# Patient Record
Sex: MALE | Race: WHITE
[De-identification: names, ages, dates, MRNs, and addresses within clinical notes are randomized per-mention and may not be internally consistent; named-entity substitution may affect disease eponyms.]

---

## 2020-01-07 ENCOUNTER — HOSPITAL ENCOUNTER (INPATIENT)
Dept: HOSPITAL 25 - ED | Age: 40
LOS: 3 days | Discharge: HOME | DRG: 753 | End: 2020-01-10
Attending: PSYCHIATRY & NEUROLOGY | Admitting: PSYCHIATRY & NEUROLOGY
Payer: COMMERCIAL

## 2020-01-07 DIAGNOSIS — F17.210: ICD-10-CM

## 2020-01-07 DIAGNOSIS — Z88.5: ICD-10-CM

## 2020-01-07 DIAGNOSIS — Z62.810: ICD-10-CM

## 2020-01-07 DIAGNOSIS — Z88.1: ICD-10-CM

## 2020-01-07 DIAGNOSIS — K21.9: ICD-10-CM

## 2020-01-07 DIAGNOSIS — F31.9: Primary | ICD-10-CM

## 2020-01-07 DIAGNOSIS — Z91.040: ICD-10-CM

## 2020-01-07 DIAGNOSIS — F60.2: ICD-10-CM

## 2020-01-07 DIAGNOSIS — Z81.1: ICD-10-CM

## 2020-01-07 DIAGNOSIS — Z79.899: ICD-10-CM

## 2020-01-07 DIAGNOSIS — Z91.030: ICD-10-CM

## 2020-01-07 LAB
ALBUMIN SERPL BCG-MCNC: 4.4 G/DL (ref 3.2–5.2)
ALBUMIN/GLOB SERPL: 2 {RATIO} (ref 1–3)
ALP SERPL-CCNC: 54 U/L (ref 34–104)
ALT SERPL W P-5'-P-CCNC: 13 U/L (ref 7–52)
ANION GAP SERPL CALC-SCNC: 5 MMOL/L (ref 2–11)
APAP SERPL-MCNC: < 15 MCG/ML
AST SERPL-CCNC: 15 U/L (ref 13–39)
BASOPHILS # BLD AUTO: 0.1 10^3/UL (ref 0–0.2)
BUN SERPL-MCNC: 16 MG/DL (ref 6–24)
BUN/CREAT SERPL: 18.4 (ref 8–20)
CALCIUM SERPL-MCNC: 9.3 MG/DL (ref 8.6–10.3)
CHLORIDE SERPL-SCNC: 105 MMOL/L (ref 101–111)
EOSINOPHIL # BLD AUTO: 0.2 10^3/UL (ref 0–0.6)
GLOBULIN SER CALC-MCNC: 2.2 G/DL (ref 2–4)
GLUCOSE SERPL-MCNC: 114 MG/DL (ref 70–100)
HCO3 SERPL-SCNC: 27 MMOL/L (ref 22–32)
HCT VFR BLD AUTO: 43 % (ref 42–52)
HGB BLD-MCNC: 15 G/DL (ref 14–18)
LYMPHOCYTES # BLD AUTO: 2 10^3/UL (ref 1–4.8)
MCH RBC QN AUTO: 32 PG (ref 27–31)
MCHC RBC AUTO-ENTMCNC: 35 G/DL (ref 31–36)
MCV RBC AUTO: 92 FL (ref 80–94)
MONOCYTES # BLD AUTO: 0.7 10^3/UL (ref 0–0.8)
NEUTROPHILS # BLD AUTO: 6.9 10^3/UL (ref 1.5–7.7)
NRBC # BLD AUTO: 0 10^3/UL
NRBC BLD QL AUTO: 0
PLATELET # BLD AUTO: 168 10^3/UL (ref 150–450)
POTASSIUM SERPL-SCNC: 4.2 MMOL/L (ref 3.5–5)
PROT SERPL-MCNC: 6.6 G/DL (ref 6.4–8.9)
RBC # BLD AUTO: 4.66 10^6 /UL (ref 4.18–5.48)
SALICYLATES SERPL-MCNC: < 2.5 MG/DL (ref ?–30)
SODIUM SERPL-SCNC: 137 MMOL/L (ref 135–145)
TSH SERPL-ACNC: 0.99 MCIU/ML (ref 0.34–5.6)
WBC # BLD AUTO: 9.9 10^3/UL (ref 3.5–10.8)

## 2020-01-07 PROCEDURE — 80061 LIPID PANEL: CPT

## 2020-01-07 PROCEDURE — 99222 1ST HOSP IP/OBS MODERATE 55: CPT

## 2020-01-07 PROCEDURE — 84443 ASSAY THYROID STIM HORMONE: CPT

## 2020-01-07 PROCEDURE — 80053 COMPREHEN METABOLIC PANEL: CPT

## 2020-01-07 PROCEDURE — 85025 COMPLETE CBC W/AUTO DIFF WBC: CPT

## 2020-01-07 PROCEDURE — 81003 URINALYSIS AUTO W/O SCOPE: CPT

## 2020-01-07 PROCEDURE — 80329 ANALGESICS NON-OPIOID 1 OR 2: CPT

## 2020-01-07 PROCEDURE — 83036 HEMOGLOBIN GLYCOSYLATED A1C: CPT

## 2020-01-07 PROCEDURE — 99284 EMERGENCY DEPT VISIT MOD MDM: CPT

## 2020-01-07 PROCEDURE — 80178 ASSAY OF LITHIUM: CPT

## 2020-01-07 PROCEDURE — 90853 GROUP PSYCHOTHERAPY: CPT

## 2020-01-07 PROCEDURE — G0480 DRUG TEST DEF 1-7 CLASSES: HCPCS

## 2020-01-07 PROCEDURE — 80320 DRUG SCREEN QUANTALCOHOLS: CPT

## 2020-01-07 PROCEDURE — 36415 COLL VENOUS BLD VENIPUNCTURE: CPT

## 2020-01-07 PROCEDURE — 93005 ELECTROCARDIOGRAM TRACING: CPT

## 2020-01-07 PROCEDURE — 99231 SBSQ HOSP IP/OBS SF/LOW 25: CPT

## 2020-01-07 PROCEDURE — 99238 HOSP IP/OBS DSCHRG MGMT 30/<: CPT

## 2020-01-07 PROCEDURE — 96130 PSYCL TST EVAL PHYS/QHP 1ST: CPT

## 2020-01-07 PROCEDURE — 80307 DRUG TEST PRSMV CHEM ANLYZR: CPT

## 2020-01-07 RX ADMIN — PANTOPRAZOLE SODIUM SCH: 40 TABLET, DELAYED RELEASE ORAL at 23:55

## 2020-01-07 RX ADMIN — THERA TABS SCH: TAB at 18:17

## 2020-01-07 RX ADMIN — NICOTINE SCH PATCH: 21 PATCH TRANSDERMAL at 18:17
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
## 2020-01-07 NOTE — XMS REPORT
Continuity of Care Document (CCD)

 Created on:2019



Patient:Joe Hernandez III

Sex:Male

:1980

External Reference #:[id number]





Demographics







 Address  [address] [place] [address]

 

 Home Phone  [phone]

 

 Mobile Phone  [phone]

 

 Email Address  [email or web address]

 

 Preferred Language  en

 

 Marital Status  Not  or 

 

 Restorationist Affiliation  Unknown

 

 Race  White

 

 Ethnic Group  Not  or 









Author







 Name  Gilson Red MD (transmitted by agent of provider Berkley Dahl)

 

 Address  [address]



   Unavailable



   [place] [address]









Care Team Providers







 Name  Role  Phone

 

 Concepcion Granados PA - Medical  Care Team Information   [phone]









Problems







 Description

 

 No Information Available







Social History







 Type  Date  Description  Comments

 

 Birth Sex    Unknown  

 

 ETOH Use    Denies alcohol use  

 

 Tobacco Use  Start: Unknown  Patient is a current smoker,  



     smokes every day  

 

 Recreational Drug Use    Current Drug User  

 

 Recreational Drug Use    Cannabis vapor script as needed  

 

 Smoking Status  Reviewed: 19  Patient is a current smoker,  



     smokes every day  

 

 Exercise Type/Frequency    Does not exercise  







Allergies, Adverse Reactions, Alerts







 Active Allergies  Reaction  Severity  Comments  Date

 

 Cephalexin        2017

 

 Latex        2017

 

 Codeine        2017

 

 Bee Sting        2017







Medications







 Active Medications  SIG  Qnty  Indications  Ordering Provider  Date

 

 Protonix  1 by mouth daily      Unknown  



        40mg Tablets          



 DR          

 

 Epipen 2-Stephon  use as directed      Unknown  



           



 0.3mg/0.3ML Solution          



 Auto-Inject          



           

 

 Ventolin HFA  2 puffs by mouth      Unknown  



   four times a day as        



 108(90Base) mcg/Act  needed        



 Aerosol          



           

 

 Albuterol Sulfate  1 vial via      Unknown  



   nebulizer 4 times        



 (2.5mg/3ML) 0.083%  daily as needed        



 Nebulizer          



           

 

 Diltiazem HCL ER        Unknown  



                120mg          



 Caps ER 12HR          



           







Immunizations







 Description

 

 No Information Available







Vital Signs







 Date  Vital  Result  Comment

 

 2019  1:59pm  Height  69 inches  5'9"









 Weight  207.00 lb  

 

 Heart Rate  85 /min  

 

 BP Systolic Sitting  120 mmHg  

 

 BP Diastolic Sitting  82 mmHg  

 

 Body Temperature  98.0 F  

 

 BMI (Body Mass Index)  30.6 kg/m2  









 2017 12:30pm  Height  69 inches  5'9"









 Weight  235.00 lb  

 

 Heart Rate  76 /min  

 

 BP Systolic Recheck  132 mmHg  

 

 BP Diastolic Recheck  84 mmHg  

 

 Respiratory Rate  16 /min  

 

 Body Temperature  98.6 F  

 

 BMI (Body Mass Index)  34.7 kg/m2  







Results







 Description

 

 No Information Available







Procedures







 Description

 

 No Information Available







Medical Devices







 Description

 

 No Information Available







Encounters







 Description

 

 No Information Available







Assessments







 Date  Code  Description  Provider

 

 2019  K43.2  Incisional hernia without obstruction or  Gilson Red MD



     gangrene  







Plan of Treatment

Future Appointment(s):2020  1:30 pm - Gilson Red MD at [facility] AT Dud[facility]19/06/2020 10:00 am - Gilson Red MD at 
[facility] AT Ypjmskss32/12/2019 - Gilson Red MDK43.2 
Incisional hernia without obstruction or gangreneFollow up:Please follow the 
instructions for the scheduling of your surgery.  Please call if you have any 
questions or concerns.



Functional Status







 Description

 

 No Information Available







Mental Status







 Description

 

 No Information Available







Referrals







 Description

 

 No Information Available

## 2020-01-07 NOTE — XMS REPORT
Continuity of Care Document (CCD)

 Created on:2019



Patient:Joe Hernandez III

Sex:Male

:1980

External Reference #:[id number]





Demographics







 Address  [address] [place] [address]

 

 Home Phone  [phone]

 

 Mobile Phone  [phone]

 

 Email Address  [email or web address]

 

 Preferred Language  en

 

 Marital Status  Not  or 

 

 Episcopal Affiliation  Unknown

 

 Race  White

 

 Ethnic Group  Not  or 









Author







 Name  Gilson Red MD (transmitted by agent of provider Berkley Dahl)

 

 Address  [address]



   Unavailable



   [place] [address]









Care Team Providers







 Name  Role  Phone

 

 Concepcion Granados PA - Medical  Care Team Information   [phone]









Problems







 Description

 

 No Information Available







Social History







 Type  Date  Description  Comments

 

 Birth Sex    Unknown  

 

 ETOH Use    Denies alcohol use  

 

 Tobacco Use  Start: Unknown  Patient is a current smoker,  



     smokes every day  

 

 Recreational Drug Use    Current Drug User  

 

 Recreational Drug Use    Cannabis vapor script as needed  

 

 Smoking Status  Reviewed: 19  Patient is a current smoker,  



     smokes every day  

 

 Exercise Type/Frequency    Does not exercise  







Allergies, Adverse Reactions, Alerts







 Active Allergies  Reaction  Severity  Comments  Date

 

 Cephalexin        2017

 

 Latex        2017

 

 Codeine        2017

 

 Bee Sting        2017







Medications







 Active Medications  SIG  Qnty  Indications  Ordering Provider  Date

 

 Protonix  1 by mouth daily      Unknown  



        40mg Tablets          



 DR          

 

 Epipen 2-Stephon  use as directed      Unknown  



           



 0.3mg/0.3ML Solution          



 Auto-Inject          



           

 

 Ventolin HFA  2 puffs by mouth      Unknown  



   four times a day as        



 108(90Base) mcg/Act  needed        



 Aerosol          



           

 

 Albuterol Sulfate  1 vial via      Unknown  



   nebulizer 4 times        



 (2.5mg/3ML) 0.083%  daily as needed        



 Nebulizer          



           

 

 Diltiazem HCL ER        Unknown  



                120mg          



 Caps ER 12HR          



           







Immunizations







 Description

 

 No Information Available







Vital Signs







 Date  Vital  Result  Comment

 

 2019  1:59pm  Height  69 inches  5'9"









 Weight  207.00 lb  

 

 Heart Rate  85 /min  

 

 BP Systolic Sitting  120 mmHg  

 

 BP Diastolic Sitting  82 mmHg  

 

 Body Temperature  98.0 F  

 

 BMI (Body Mass Index)  30.6 kg/m2  









 2017 12:30pm  Height  69 inches  5'9"









 Weight  235.00 lb  

 

 Heart Rate  76 /min  

 

 BP Systolic Recheck  132 mmHg  

 

 BP Diastolic Recheck  84 mmHg  

 

 Respiratory Rate  16 /min  

 

 Body Temperature  98.6 F  

 

 BMI (Body Mass Index)  34.7 kg/m2  







Results







 Description

 

 No Information Available







Procedures







 Description

 

 No Information Available







Medical Devices







 Description

 

 No Information Available







Encounters







 Description

 

 No Information Available







Assessments







 Date  Code  Description  Provider

 

 2019  K43.2  Incisional hernia without obstruction or  Gilson Red MD



     gangrene  







Plan of Treatment

Future Appointment(s):2020  1:30 pm - Gilson Red MD at [facility] AT Nzspqmql37/06/2020 10:00 am - Gilson Red MD at 
[facility] AT [facility]98/12/2019 - Gilson Red MDK43.2 
Incisional hernia without obstruction or gangreneFollow up:Please follow the 
instructions for the scheduling of your surgery.  Please call if you have any 
questions or concerns.



Functional Status







 Description

 

 No Information Available







Mental Status







 Description

 

 No Information Available







Referrals







 Description

 

 No Information Available

## 2020-01-08 LAB
CHOLEST SERPL-MCNC: 164 MG/DL
HDLC SERPL-MCNC: 47.8 MG/DL
TRIGL SERPL-MCNC: 112 MG/DL

## 2020-01-08 PROCEDURE — GZHZZZZ GROUP PSYCHOTHERAPY: ICD-10-PCS

## 2020-01-08 RX ADMIN — LITHIUM CARBONATE SCH MG: 300 TABLET ORAL at 12:32

## 2020-01-08 RX ADMIN — LITHIUM CARBONATE SCH MG: 300 TABLET ORAL at 20:30

## 2020-01-08 RX ADMIN — ARIPIPRAZOLE SCH MG: 5 TABLET ORAL at 20:30

## 2020-01-08 RX ADMIN — NICOTINE POLACRILEX PRN MG: 4 LOZENGE ORAL at 20:30

## 2020-01-08 RX ADMIN — THERA TABS SCH: TAB at 09:22

## 2020-01-08 RX ADMIN — PANTOPRAZOLE SODIUM SCH MG: 40 TABLET, DELAYED RELEASE ORAL at 20:30

## 2020-01-08 RX ADMIN — PANTOPRAZOLE SODIUM SCH MG: 40 TABLET, DELAYED RELEASE ORAL at 07:47

## 2020-01-08 RX ADMIN — NICOTINE SCH PATCH: 21 PATCH TRANSDERMAL at 07:48

## 2020-01-08 RX ADMIN — DILTIAZEM HYDROCHLORIDE SCH MG: 240 CAPSULE, COATED, EXTENDED RELEASE ORAL at 07:47

## 2020-01-08 RX ADMIN — NICOTINE POLACRILEX PRN MG: 4 LOZENGE ORAL at 17:43
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
## 2020-01-08 NOTE — PN
BSU: Group Therapy Note





- Service Type


Service Type: 76247 Group Psychotherapy - Medication Education Group:  Patient 

was attentive and participatory in group, and remained in good behavioral 

control.  Patient expressed positive insights regarding relevant treatment 

interventions.  Patient stated understanding of material discussed and had 

appropriate questions.  Patient defensive and irritable when redirected.

## 2020-01-09 RX ADMIN — THERA TABS SCH: TAB at 08:12

## 2020-01-09 RX ADMIN — NICOTINE SCH PATCH: 21 PATCH TRANSDERMAL at 08:14

## 2020-01-09 RX ADMIN — PANTOPRAZOLE SODIUM SCH MG: 40 TABLET, DELAYED RELEASE ORAL at 20:09

## 2020-01-09 RX ADMIN — NICOTINE POLACRILEX PRN MG: 4 LOZENGE ORAL at 12:42

## 2020-01-09 RX ADMIN — NICOTINE POLACRILEX PRN MG: 4 LOZENGE ORAL at 20:33

## 2020-01-09 RX ADMIN — NICOTINE POLACRILEX PRN MG: 4 LOZENGE ORAL at 10:18

## 2020-01-09 RX ADMIN — LITHIUM CARBONATE SCH MG: 300 TABLET ORAL at 20:09

## 2020-01-09 RX ADMIN — DILTIAZEM HYDROCHLORIDE SCH MG: 240 CAPSULE, COATED, EXTENDED RELEASE ORAL at 08:13

## 2020-01-09 RX ADMIN — ARIPIPRAZOLE SCH MG: 5 TABLET ORAL at 20:09

## 2020-01-09 RX ADMIN — NICOTINE POLACRILEX PRN MG: 4 LOZENGE ORAL at 17:47

## 2020-01-09 RX ADMIN — LITHIUM CARBONATE SCH MG: 300 TABLET ORAL at 08:12

## 2020-01-09 RX ADMIN — PANTOPRAZOLE SODIUM SCH MG: 40 TABLET, DELAYED RELEASE ORAL at 08:11
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
## 2020-01-09 NOTE — PN
BSU: Group Therapy Note





- Service Type


Service Type: 13888 Group Psychotherapy - Cognitive Behavioral Group Therapy (

CBT):Patient was attentive and participatory in CBT programming this morning, 

and remained in good behavioral control.  Patient expressed positive insights 

regarding relevant treatment interventions and goals.  However he needed 

redirection at the end of the group regarding discussion of appropriate use of 

humour.

## 2020-01-10 VITALS — DIASTOLIC BLOOD PRESSURE: 97 MMHG | SYSTOLIC BLOOD PRESSURE: 142 MMHG

## 2020-01-10 RX ADMIN — PANTOPRAZOLE SODIUM SCH MG: 40 TABLET, DELAYED RELEASE ORAL at 08:21

## 2020-01-10 RX ADMIN — LITHIUM CARBONATE SCH MG: 300 TABLET ORAL at 08:21

## 2020-01-10 RX ADMIN — THERA TABS SCH: TAB at 08:21

## 2020-01-10 RX ADMIN — NICOTINE POLACRILEX PRN MG: 4 LOZENGE ORAL at 10:56

## 2020-01-10 RX ADMIN — NICOTINE SCH PATCH: 21 PATCH TRANSDERMAL at 09:02

## 2020-01-10 RX ADMIN — DILTIAZEM HYDROCHLORIDE SCH MG: 240 CAPSULE, COATED, EXTENDED RELEASE ORAL at 09:01
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
